# Patient Record
Sex: MALE | Race: WHITE | URBAN - METROPOLITAN AREA
[De-identification: names, ages, dates, MRNs, and addresses within clinical notes are randomized per-mention and may not be internally consistent; named-entity substitution may affect disease eponyms.]

---

## 2018-11-27 LAB
ABS NEUT (OLG): 5.7 X10(3)/MCL (ref 1.5–6.9)
ALBUMIN SERPL-MCNC: 4.2 GM/DL (ref 3.4–5)
ALBUMIN/GLOB SERPL: 1.2 RATIO
ALP SERPL-CCNC: 112 UNIT/L (ref 30–113)
ALT SERPL-CCNC: 44 UNIT/L (ref 10–45)
APTT PPP: 28.9 SECOND(S) (ref 25–35)
AST SERPL-CCNC: 24 UNIT/L (ref 15–37)
BASOPHILS # BLD AUTO: 0.1 X10(3)/MCL (ref 0–0.1)
BASOPHILS NFR BLD AUTO: 1 % (ref 0–1)
BILIRUB SERPL-MCNC: 0.4 MG/DL (ref 0.1–0.9)
BILIRUBIN DIRECT+TOT PNL SERPL-MCNC: 0.1 MG/DL (ref 0–0.3)
BILIRUBIN DIRECT+TOT PNL SERPL-MCNC: 0.3 MG/DL
BUN SERPL-MCNC: 14 MG/DL (ref 10–20)
CALCIUM SERPL-MCNC: 9.4 MG/DL (ref 8–10.5)
CHLORIDE SERPL-SCNC: 102 MMOL/L (ref 100–108)
CO2 SERPL-SCNC: 28 MMOL/L (ref 21–35)
CREAT SERPL-MCNC: 1.16 MG/DL (ref 0.7–1.3)
EOSINOPHIL # BLD AUTO: 0.5 X10(3)/MCL (ref 0–0.6)
EOSINOPHIL NFR BLD AUTO: 5 % (ref 0–5)
ERYTHROCYTE [DISTWIDTH] IN BLOOD BY AUTOMATED COUNT: 12.7 % (ref 11.5–17)
GLOBULIN SER-MCNC: 3.5 GM/DL
GLUCOSE SERPL-MCNC: 97 MG/DL (ref 75–116)
HCT VFR BLD AUTO: 48.5 % (ref 42–52)
HGB BLD-MCNC: 16.2 GM/DL (ref 14–18)
IMM GRANULOCYTES # BLD AUTO: 0.05 10*3/UL (ref 0–0.02)
IMM GRANULOCYTES NFR BLD AUTO: 0.5 % (ref 0–0.43)
INR PPP: 0.9 (ref 0–1.2)
LYMPHOCYTES # BLD AUTO: 3.5 X10(3)/MCL (ref 0.5–4.1)
LYMPHOCYTES NFR BLD AUTO: 32 % (ref 15–40)
MCH RBC QN AUTO: 29 PG (ref 27–34)
MCHC RBC AUTO-ENTMCNC: 33 GM/DL (ref 31–36)
MCV RBC AUTO: 86 FL (ref 80–99)
MONOCYTES # BLD AUTO: 1.1 X10(3)/MCL (ref 0–1.1)
MONOCYTES NFR BLD AUTO: 10 % (ref 4–12)
NEUTROPHILS # BLD AUTO: 5.7 X10(3)/MCL (ref 1.5–6.9)
NEUTROPHILS NFR BLD AUTO: 52 % (ref 43–75)
PLATELET # BLD AUTO: 301 X10(3)/MCL (ref 140–400)
PMV BLD AUTO: 9.7 FL (ref 6.8–10)
POTASSIUM SERPL-SCNC: 3.9 MMOL/L (ref 3.6–5.2)
PROT SERPL-MCNC: 7.7 GM/DL (ref 6.4–8.2)
PROTHROMBIN TIME: 9.7 SECOND(S) (ref 9–12)
RBC # BLD AUTO: 5.62 X10(6)/MCL (ref 4.7–6.1)
SODIUM SERPL-SCNC: 140 MMOL/L (ref 135–145)
WBC # SPEC AUTO: 10.9 X10(3)/MCL (ref 4.5–11.5)

## 2018-11-29 ENCOUNTER — HISTORICAL (OUTPATIENT)
Dept: ANESTHESIOLOGY | Facility: HOSPITAL | Age: 28
End: 2018-11-29

## 2019-04-22 ENCOUNTER — HOSPITAL ENCOUNTER (OUTPATIENT)
Dept: MEDSURG UNIT | Facility: HOSPITAL | Age: 29
End: 2019-04-23
Attending: SURGERY | Admitting: SURGERY

## 2019-04-22 LAB
ABS NEUT (OLG): 9.7 X10(3)/MCL (ref 1.5–6.9)
ALBUMIN SERPL-MCNC: 3.9 GM/DL (ref 3.4–5)
ALBUMIN/GLOB SERPL: 1 RATIO
ALP SERPL-CCNC: 108 UNIT/L (ref 30–113)
ALT SERPL-CCNC: 33 UNIT/L (ref 10–45)
APPEARANCE, UA: CLEAR
APTT PPP: 32 SECOND(S) (ref 25–35)
AST SERPL-CCNC: 17 UNIT/L (ref 15–37)
BASOPHILS # BLD AUTO: 0.1 X10(3)/MCL (ref 0–0.1)
BASOPHILS NFR BLD AUTO: 0 % (ref 0–1)
BILIRUB SERPL-MCNC: 0.9 MG/DL (ref 0.1–0.9)
BILIRUB UR QL STRIP: NEGATIVE
BILIRUBIN DIRECT+TOT PNL SERPL-MCNC: 0.2 MG/DL (ref 0–0.3)
BILIRUBIN DIRECT+TOT PNL SERPL-MCNC: 0.7 MG/DL
BUN SERPL-MCNC: 12 MG/DL (ref 10–20)
CALCIUM SERPL-MCNC: 9 MG/DL (ref 8–10.5)
CHLORIDE SERPL-SCNC: 99 MMOL/L (ref 100–108)
CO2 SERPL-SCNC: 25 MMOL/L (ref 21–35)
COLOR UR: YELLOW
CREAT SERPL-MCNC: 1 MG/DL (ref 0.7–1.3)
EOSINOPHIL # BLD AUTO: 0.1 X10(3)/MCL (ref 0–0.6)
EOSINOPHIL NFR BLD AUTO: 1 % (ref 0–5)
ERYTHROCYTE [DISTWIDTH] IN BLOOD BY AUTOMATED COUNT: 13.4 % (ref 11.5–17)
GLOBULIN SER-MCNC: 3.8 GM/DL
GLUCOSE (UA): NEGATIVE
GLUCOSE SERPL-MCNC: 88 MG/DL (ref 75–116)
HCT VFR BLD AUTO: 46.7 % (ref 42–52)
HGB BLD-MCNC: 15.7 GM/DL (ref 14–18)
HGB UR QL STRIP: NEGATIVE
IMM GRANULOCYTES # BLD AUTO: 0.05 10*3/UL (ref 0–0.02)
IMM GRANULOCYTES NFR BLD AUTO: 0.4 % (ref 0–0.43)
INR PPP: 1 (ref 0–1.2)
KETONES UR QL STRIP: NEGATIVE
LEUKOCYTE ESTERASE UR QL STRIP: NEGATIVE
LYMPHOCYTES # BLD AUTO: 2.7 X10(3)/MCL (ref 0.5–4.1)
LYMPHOCYTES NFR BLD AUTO: 20 % (ref 15–40)
MCH RBC QN AUTO: 29 PG (ref 27–34)
MCHC RBC AUTO-ENTMCNC: 34 GM/DL (ref 31–36)
MCV RBC AUTO: 86 FL (ref 80–99)
MONOCYTES # BLD AUTO: 1 X10(3)/MCL (ref 0–1.1)
MONOCYTES NFR BLD AUTO: 7 % (ref 4–12)
NEUTROPHILS # BLD AUTO: 9.7 X10(3)/MCL (ref 1.5–6.9)
NEUTROPHILS NFR BLD AUTO: 71 % (ref 43–75)
NITRITE UR QL STRIP: NEGATIVE
PH UR STRIP: 6 [PH]
PHOSPHATE SERPL-MCNC: 3.1 MG/DL (ref 2.6–4.7)
PLATELET # BLD AUTO: 294 X10(3)/MCL (ref 140–400)
PMV BLD AUTO: 9.7 FL (ref 6.8–10)
POTASSIUM SERPL-SCNC: 3.9 MMOL/L (ref 3.6–5.2)
PROT SERPL-MCNC: 7.7 GM/DL (ref 6.4–8.2)
PROT UR QL STRIP: NEGATIVE
PROTHROMBIN TIME: 9.8 SECOND(S) (ref 9–12)
RBC # BLD AUTO: 5.45 X10(6)/MCL (ref 4.7–6.1)
SODIUM SERPL-SCNC: 136 MMOL/L (ref 135–145)
SP GR UR STRIP: 1.02
UROBILINOGEN UR STRIP-ACNC: 1 EU/DL
WBC # SPEC AUTO: 13.7 X10(3)/MCL (ref 4.5–11.5)

## 2019-04-24 LAB — GRAM STN SPEC: NORMAL

## 2022-05-01 NOTE — OP NOTE
ADMITTING DIAGNOSES:    1. 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  2. Rectal bleeding.  3. Grade 2 to 3 internal hemorrhoids.    PROCEDURES:    1. Colonoscopy to the cecum with intubation of ileocecal valve.    2. Probing of 2 small fistula in ano that were superficial to the external anal sphincter.   3. Fistulotomy x2 of a fistula-in-ano abscesses at the 7 o'clock and 8 o'clock left gluteal position that met in the same location at the 6 o'clock position of the anorectal dentate line.    POSTOPERATIVE DIAGNOSES:    1. Normal terminal ileum up to 20 cm.   2. Normal colonoscopy with diverticulosis of the sigmoid colon.  3. Grade 2 to 3 internal hemorrhoids.    4. Two superficial fistula-in-ano meeting at the 7 and 8 o'clock position meeting at the 6 o'clock position along the dentate line.    INDICATIONS:  The patient is a 28-year-old  male with a history of hemorrhoids and abscesses in the perirectal and perianal area.  The initial abscess was about 7 cm from the anorectal verge.  The second abscess was about 4 cm from the anorectal verge.  The patient had complaints of rectal bleeding as well.  He underwent colonoscopy to the cecum with intubation of ileocecal valve under sedation in the prone julien-knife position with spinal anesthetic on board.  The patient had normal terminal ileum up to 20 cm.  Cecum, ascending colon, transverse colon, and descending colon were normal.  The rectosigmoid colon had some diverticulosis.  Grade 2 to 3 internal hemorrhoids were noted.  Patient did have abscesses at the 7 and 8 o'clock position.  They met at the dentate line at the 6 o'clock position.  They underwent incision, drainage, debridement, and washout.  They underwent probing of the and then opening with a 15 blade scalpel and Bovie cautery.  The area was cauterized and hemostasis was achieved.  I was able to stay above the external sphincter, and did not need a  seton rubber band.  Patient did well, had no difficulties, was awakened and sent to recovery in good condition.  The patient had Surgicel, Gelfoam, and Xeroform gauze placed over the open wounds.       I appreciate the consultation referral from Dr. Martha Whaley and will notify him of my findings.        DENA/TIFFANIE   DD: 11/29/2018 1419   DT: 11/29/2018 1554  Job # [id number]    cc: Martha Whaley M.D.

## 2022-05-01 NOTE — OP NOTE
ADMITTING DIAGNOSIS:  Right gluteal abscess.    PROCEDURE:  Incision, drainage, debridement and washout of right gluteal abscess.    BRIEF HISTORY:  Patient is a 28-year-old male with a history of hemorrhoids, had abscess on the right gluteal region that required incision, drainage, debridement, and washout.  He was treated with clindamycin as an outpatient through the ER, did not resolve his abscess and needed excision.  The abscess itself had about 30 cc of brick red pus within it.  I aspirated with an 18-gauge needle and sent the pus off for culture, aerobic, anaerobic, and Gram stain.  Patient then had incision drainage of the abscess done.  The abscess measured about 7 cm long about 2-2 3 cm deep and about 3 cm wide.  Sterile dressings were applied.  Surgicel, Gelfoam and gauze were applied.  No problems were encountered.  Patient tolerated the procedure well.  Overall the patient did exceptionally well.  He will undergo IV fluids, antibiotics and dressing changes along with monitoring overnight and then probably discharge in the morning.  I appreciate the consultation referral from Dr. Martha Whaley and will notify him of my findings.        DENA/TIFFANIE   DD: 04/22/2019 1459   DT: 04/22/2019 1506  Job # [id number]    cc: Martha Whaley M.D.

## 2022-05-05 NOTE — HISTORICAL [facility]
This is a historical note converted from [employer]. Formatting and pictures may have been removed.  Please reference [employer] for original formatting and attached multimedia. Admit and Discharge Dates  Admit Date: 04/22/2019  Discharge Date: 04/23/2019  ?  Physicians  Attending Physician - Grayson GARRISON, Emanuel  Attending Physician - Grayson GARRISON, [facility]  Admitting Physician - Grayson GARRISON, [facility]  Primary Care Physician - Judd GARRISON, Martha ROSARIO  ?  Surgical Procedures  04/22/2019 - [id number] - Incision & Drainage  ?  Immunizations  No immunizations recorded for this visit.  ?  Admission Information  ?Patient is a 28-year-old male with a history of hemorrhoids, had abscess on the right gluteal region that required incision, drainage, debridement, and washout. ?He was treated with clindamycin as an outpatient through the ER, did not resolve his abscess and needed excision. ?The abscess itself had about 30 cc of brick red pus within it. ?I aspirated with an 18-gauge needle and sent the pus off for culture, aerobic, anaerobic, and Gram stain. ?Patient then had incision drainage of the abscess done. ?The abscess measured about 7 cm long about 2-2 3 cm deep and about 3 cm wide. ?Sterile dressings were applied. ?Surgicel, Gelfoam and gauze were applied. ?No problems were encountered. ?Patient tolerated the procedure well.?  ? He was is post operative day 1,he ?was observed over night. No complications or problems reported over night. The patient reports tolerating liquids, ambulating without difficulty. Incision site is dressed well, no bleeding noted. Incision is dry clean and intact. Instructions provided on incision care. Shower BID and clean with soap and water, pack with bree soaked gauze BID. Verbalized understanding. He is satisfied with his care?at this time. ?Follow up appointment made and given to the patient.  ?  Time Spent on discharge  20 minutes   Discharge Medication Reconciliation  Discharge Med Rec is not  complete  Education and Orders Provided  Abscess, Easy-to-Read (Custom)  Discharge - 04/22/19 15:07:00 CDT, Home when stable, tolerating PO fluids, and cleared by Anesthesia?  Discharge - 04/23/19 13:28:00 CDT, Home?  ?  Follow up  Follow-up with PCP in 3 to 5 days, on 04/30/2019  ????Keep scheduled appointment  ?